# Patient Record
Sex: MALE | Race: BLACK OR AFRICAN AMERICAN | URBAN - METROPOLITAN AREA
[De-identification: names, ages, dates, MRNs, and addresses within clinical notes are randomized per-mention and may not be internally consistent; named-entity substitution may affect disease eponyms.]

---

## 2023-02-10 ENCOUNTER — APPOINTMENT (RX ONLY)
Dept: URBAN - METROPOLITAN AREA CLINIC 132 | Facility: CLINIC | Age: 53
Setting detail: DERMATOLOGY
End: 2023-02-10

## 2023-02-10 DIAGNOSIS — L81.1 CHLOASMA: ICD-10-CM

## 2023-02-10 DIAGNOSIS — L82.1 OTHER SEBORRHEIC KERATOSIS: ICD-10-CM

## 2023-02-10 PROCEDURE — ? COUNSELING

## 2023-02-10 PROCEDURE — ? ADDITIONAL NOTES

## 2023-02-10 PROCEDURE — 99204 OFFICE O/P NEW MOD 45 MIN: CPT

## 2023-02-10 PROCEDURE — ? PRESCRIPTION

## 2023-02-10 RX ORDER — MEDICAL SUPPLY, MISCELLANEOUS
EACH MISCELLANEOUS QHS
Qty: 30 | Refills: 6 | Status: ERX | COMMUNITY
Start: 2023-02-10

## 2023-02-10 RX ADMIN — Medication: at 00:00

## 2023-02-10 ASSESSMENT — LOCATION ZONE DERM: LOCATION ZONE: FACE

## 2023-02-10 ASSESSMENT — LOCATION DETAILED DESCRIPTION DERM
LOCATION DETAILED: RIGHT CENTRAL MALAR CHEEK
LOCATION DETAILED: LEFT CENTRAL MALAR CHEEK
LOCATION DETAILED: LEFT INFERIOR CENTRAL MALAR CHEEK
LOCATION DETAILED: RIGHT INFERIOR CENTRAL MALAR CHEEK

## 2023-02-10 ASSESSMENT — LOCATION SIMPLE DESCRIPTION DERM
LOCATION SIMPLE: RIGHT CHEEK
LOCATION SIMPLE: LEFT CHEEK

## 2023-02-10 NOTE — PROCEDURE: ADDITIONAL NOTES
Render Risk Assessment In Note?: no
Detail Level: Simple
Additional Notes: Discussed electrodessication quoted $250

## 2023-06-22 ENCOUNTER — APPOINTMENT (RX ONLY)
Dept: URBAN - METROPOLITAN AREA CLINIC 132 | Facility: CLINIC | Age: 53
Setting detail: DERMATOLOGY
End: 2023-06-22

## 2023-06-22 DIAGNOSIS — L81.1 CHLOASMA: ICD-10-CM

## 2023-06-22 DIAGNOSIS — L82.1 OTHER SEBORRHEIC KERATOSIS: ICD-10-CM

## 2023-06-22 PROCEDURE — ? DEFER

## 2023-06-22 PROCEDURE — ? COUNSELING

## 2023-06-22 PROCEDURE — 99214 OFFICE O/P EST MOD 30 MIN: CPT

## 2023-06-22 PROCEDURE — ? PRESCRIPTION

## 2023-06-22 RX ORDER — TRANEXAMIC ACID 650 MG/1
TABLET, FILM COATED ORAL
Qty: 30 | Refills: 4 | Status: ERX | COMMUNITY
Start: 2023-06-22

## 2023-06-22 RX ADMIN — TRANEXAMIC ACID: 650 TABLET, FILM COATED ORAL at 00:00

## 2023-06-22 ASSESSMENT — LOCATION SIMPLE DESCRIPTION DERM
LOCATION SIMPLE: LEFT CHEEK
LOCATION SIMPLE: RIGHT CHEEK

## 2023-06-22 ASSESSMENT — LOCATION DETAILED DESCRIPTION DERM
LOCATION DETAILED: RIGHT INFERIOR CENTRAL MALAR CHEEK
LOCATION DETAILED: LEFT INFERIOR CENTRAL MALAR CHEEK

## 2023-06-22 ASSESSMENT — LOCATION ZONE DERM: LOCATION ZONE: FACE

## 2023-06-22 NOTE — PROCEDURE: DEFER
Detail Level: Detailed
Size Of Lesion In Cm (Optional): 0
Introduction Text (Please End With A Colon): The following procedure was deferred:
Instructions (Optional): electrodesiccation cosmetic.

## 2023-06-22 NOTE — HPI: FOLLOW-UP
condition_melasma
When Was Your Last Appointment?: 02/10/23
During The Previous Visit, The Patient ....: Was prescribed compound hydroquinone topical

## 2023-10-26 ENCOUNTER — APPOINTMENT (RX ONLY)
Dept: URBAN - METROPOLITAN AREA CLINIC 132 | Facility: CLINIC | Age: 53
Setting detail: DERMATOLOGY
End: 2023-10-26

## 2023-10-26 DIAGNOSIS — L81.1 CHLOASMA: ICD-10-CM

## 2023-10-26 DIAGNOSIS — L82.1 OTHER SEBORRHEIC KERATOSIS: ICD-10-CM

## 2023-10-26 PROCEDURE — ? ADDITIONAL NOTES

## 2023-10-26 PROCEDURE — 99213 OFFICE O/P EST LOW 20 MIN: CPT

## 2023-10-26 PROCEDURE — ? COUNSELING

## 2023-10-26 PROCEDURE — ? PRESCRIPTION

## 2023-10-26 ASSESSMENT — LOCATION ZONE DERM: LOCATION ZONE: FACE

## 2023-10-26 ASSESSMENT — LOCATION DETAILED DESCRIPTION DERM
LOCATION DETAILED: LEFT INFERIOR CENTRAL MALAR CHEEK
LOCATION DETAILED: RIGHT INFERIOR CENTRAL MALAR CHEEK

## 2023-10-26 ASSESSMENT — LOCATION SIMPLE DESCRIPTION DERM
LOCATION SIMPLE: RIGHT CHEEK
LOCATION SIMPLE: LEFT CHEEK

## 2023-10-26 NOTE — HPI: FOLLOW-UP
condition_melasma
When Was Your Last Appointment?: 06/22/23
During The Previous Visit, The Patient ....: Was prescribed tranexamic acid tablet and hydroquinone compound cream

## 2023-10-26 NOTE — PROCEDURE: ADDITIONAL NOTES
Additional Notes: Will require a 30 minute surgery appointment
Detail Level: Simple
Render Risk Assessment In Note?: no